# Patient Record
Sex: FEMALE | Race: WHITE | Employment: STUDENT | URBAN - METROPOLITAN AREA
[De-identification: names, ages, dates, MRNs, and addresses within clinical notes are randomized per-mention and may not be internally consistent; named-entity substitution may affect disease eponyms.]

---

## 2017-02-20 ENCOUNTER — OFFICE VISIT (OUTPATIENT)
Dept: FAMILY MEDICINE CLINIC | Age: 22
End: 2017-02-20

## 2017-02-20 VITALS
BODY MASS INDEX: 24.77 KG/M2 | HEIGHT: 67 IN | DIASTOLIC BLOOD PRESSURE: 80 MMHG | TEMPERATURE: 98.2 F | HEART RATE: 68 BPM | OXYGEN SATURATION: 99 % | SYSTOLIC BLOOD PRESSURE: 120 MMHG | WEIGHT: 157.8 LBS

## 2017-02-20 DIAGNOSIS — H10.32 ACUTE CONJUNCTIVITIS OF LEFT EYE, UNSPECIFIED ACUTE CONJUNCTIVITIS TYPE: Primary | ICD-10-CM

## 2017-02-20 DIAGNOSIS — R09.81 NASAL CONGESTION: ICD-10-CM

## 2017-02-20 PROCEDURE — G8427 DOCREV CUR MEDS BY ELIG CLIN: HCPCS | Performed by: NURSE PRACTITIONER

## 2017-02-20 PROCEDURE — G8484 FLU IMMUNIZE NO ADMIN: HCPCS | Performed by: NURSE PRACTITIONER

## 2017-02-20 PROCEDURE — G8420 CALC BMI NORM PARAMETERS: HCPCS | Performed by: NURSE PRACTITIONER

## 2017-02-20 PROCEDURE — 1036F TOBACCO NON-USER: CPT | Performed by: NURSE PRACTITIONER

## 2017-02-20 PROCEDURE — 99213 OFFICE O/P EST LOW 20 MIN: CPT | Performed by: NURSE PRACTITIONER

## 2017-02-20 RX ORDER — NORETHINDRONE AND ETHINYL ESTRADIOL 1 MG-35MCG
KIT ORAL
Refills: 10 | COMMUNITY
Start: 2016-12-10

## 2017-02-20 RX ORDER — POLYMYXIN B SULFATE AND TRIMETHOPRIM 1; 10000 MG/ML; [USP'U]/ML
1 SOLUTION OPHTHALMIC
Qty: 1 BOTTLE | Refills: 0 | Status: SHIPPED | OUTPATIENT
Start: 2017-02-20 | End: 2017-03-02

## 2017-02-20 RX ORDER — OXYMETAZOLINE HYDROCHLORIDE 0.05 G/100ML
2 SPRAY NASAL 2 TIMES DAILY
Refills: 3 | COMMUNITY
Start: 2017-02-20 | End: 2018-02-20

## 2017-02-20 RX ORDER — CLINDAMYCIN PHOSPHATE AND BENZOYL PEROXIDE 10; 50 MG/G; MG/G
GEL TOPICAL
Refills: 1 | COMMUNITY
Start: 2016-11-25

## 2022-09-06 ENCOUNTER — INITIAL PRENATAL (OUTPATIENT)
Dept: OBGYN CLINIC | Age: 27
End: 2022-09-06

## 2022-09-06 ENCOUNTER — ANCILLARY PROCEDURE (OUTPATIENT)
Dept: OBGYN CLINIC | Age: 27
End: 2022-09-06

## 2022-09-06 VITALS
WEIGHT: 179 LBS | DIASTOLIC BLOOD PRESSURE: 76 MMHG | HEIGHT: 68 IN | SYSTOLIC BLOOD PRESSURE: 115 MMHG | HEART RATE: 66 BPM | BODY MASS INDEX: 27.13 KG/M2

## 2022-09-06 DIAGNOSIS — Z36.3 ANTENATAL SCREENING FOR MALFORMATION USING ULTRASONICS: Primary | ICD-10-CM

## 2022-09-06 DIAGNOSIS — Z34.02 PRIMIGRAVIDA IN SECOND TRIMESTER: ICD-10-CM

## 2022-09-06 DIAGNOSIS — Z3A.20 20 WEEKS GESTATION OF PREGNANCY: ICD-10-CM

## 2022-09-06 LAB
GLUCOSE URINE, POC: NORMAL
PROTEIN UA: NEGATIVE

## 2022-09-06 PROCEDURE — 99999 PR OFFICE/OUTPT VISIT,PROCEDURE ONLY: CPT | Performed by: OBSTETRICS & GYNECOLOGY

## 2022-09-06 PROCEDURE — 99203 OFFICE O/P NEW LOW 30 MIN: CPT | Performed by: OBSTETRICS & GYNECOLOGY

## 2022-09-06 PROCEDURE — 81002 URINALYSIS NONAUTO W/O SCOPE: CPT | Performed by: OBSTETRICS & GYNECOLOGY

## 2022-09-06 PROCEDURE — 76811 OB US DETAILED SNGL FETUS: CPT | Performed by: OBSTETRICS & GYNECOLOGY

## 2022-09-06 NOTE — PROGRESS NOTES
[address] [facility]  [address] [facility] [address] [phone]   [address] [facility]  [address] [facility] [address] [address]  Dept: [phone]  Loc: [phone]     2022    RE:  Svitlana Barnett     : 1995   AGE: 31 y.o. Dear Dr. Benja Montiel,    Thank you for allowing me to see Svitlana Barnett. As I'm sure you will recall, Svitlana Barnett is a 31 y.o. [id number]'s last menstrual period was 2022. Estimated Date of Delivery: 23 at 20w3d seen in our office today for the following:    REASON FOR VISIT: Level II    Patient Active Problem List    Diagnosis Date Noted    Primigravida in second trimester 2022    20 weeks gestation of pregnancy 2022        PAST HISTORY:  OB History    Para Term  AB Living   1         0   SAB IAB Ectopic Molar Multiple Live Births                    # Outcome Date GA Lbr Paul/2nd Weight Sex Delivery Anes PTL Lv   1 Current                   MEDICAL:  Past Medical History:   Diagnosis Date    Herniation of nucleus pulposus 2015        SURGICAL:  History reviewed. No pertinent surgical history. ALLERGIES:    No Known Allergies      MEDICATIONS:    Current Outpatient Medications   Medication Sig Dispense Refill    Prenatal Vit-Fe Fumarate-FA (PRENATAL 1+1 PO) Take by mouth      Clindamycin-Benzoyl Per, Refr, 1.2-5 % GEL APPLY TO AFFECTED AREA OF FACE EVERY DAY AT BEDTIME (Patient not taking: Reported on 2022)  1    ALAYCEN  1-35 MG-MCG per tablet TAKE 1 TABLET BY MOUTH ONCE A DAY (Patient not taking: Reported on 2022)  10    loratadine-pseudoephedrine (CLARITIN-D 12 HOUR) 5-120 MG per extended release tablet Take 1 tablet by mouth 2 times daily (Patient not taking: Reported on 2022)       No current facility-administered medications for this visit.         Social History     Socioeconomic History    Marital status: Single     Spouse name: None    Number of children: None    Years of education: None    Highest education level: None   Tobacco Use    Smoking status: Never    Smokeless tobacco: Never   Vaping Use    Vaping Use: Never used   Substance and Sexual Activity    Alcohol use: Not Currently     Alcohol/week: 6.0 standard drinks     Types: 3 Cans of beer, 3 Shots of liquor per week    Drug use: No    Sexual activity: Yes     Partners: Male     Birth control/protection: Condom, Pill          FAMILY MEDICAL HISTORY:   No family history on file. PHYSICAL EXAMINATION:  /76   Pulse 66   Ht 5' 8\" (1.727 m)   Wt 179 lb (81.2 kg)   LMP 04/12/2022   Body mass index is 27.22 kg/m². Urine dipstick:  Results for POC orders placed in visit on 09/06/22   POCT urine qual dipstick protein   Result Value Ref Range    Protein, UA Negative Negative   POCT urine qual dipstick glucose   Result Value Ref Range    Glucose, UA POC neg         A/an Level II ultrasound was done in our office today. Please refer to the enclosed copy of the ultrasound report for further information. Discussion:  There is a mejia fetus in a vertex presentation. Fetal cardiac motion and fetal motion was observed and appeared to be grossly normal.  No anomalies are noted however the scan was very limited by the fetal position specifically limited cranial and cardiac views. There were no concerns for anomalies. There is been appropriate interval growth. The cervix is long and closed. The placenta was posterior. Amniotic fluid volume is normal.    IMPRESSION:  1. Single intrauterine gestation at 20+ weeks with appropriate interval growth. 2. The fetal anatomy appears normal and the fetal lie is cephalic. 3. The amniotic fluid volume is normal and the placenta is posterior. RECOMMENDATIONS:  Each of the recommendations were discussed with the patient:  1.   Return in 4 weeks for completion of anatomy. 2.  Follow-up would be otherwise as indicated. The patient is to continue to follow with you in your office for ongoing obstetric care. PLAN:    As noted above or sooner prn.     Sincerely,        Maria A Hitchcock MD

## 2022-09-06 NOTE — PROGRESS NOTES
Patient is here today for anatomy scan. Denies any vaginal bleeding, cramping, or leakage of fluids. Patient reports good fetal movement.

## 2022-10-04 ENCOUNTER — ROUTINE PRENATAL (OUTPATIENT)
Dept: OBGYN CLINIC | Age: 27
End: 2022-10-04

## 2022-10-04 ENCOUNTER — ANCILLARY PROCEDURE (OUTPATIENT)
Dept: OBGYN CLINIC | Age: 27
End: 2022-10-04

## 2022-10-04 VITALS
WEIGHT: 186 LBS | DIASTOLIC BLOOD PRESSURE: 80 MMHG | HEART RATE: 69 BPM | BODY MASS INDEX: 28.28 KG/M2 | SYSTOLIC BLOOD PRESSURE: 124 MMHG

## 2022-10-04 DIAGNOSIS — Z34.90 PREGNANCY, UNSPECIFIED GESTATIONAL AGE: Primary | ICD-10-CM

## 2022-10-04 LAB
GLUCOSE URINE, POC: NORMAL
PROTEIN UA: NEGATIVE

## 2022-10-04 PROCEDURE — 81002 URINALYSIS NONAUTO W/O SCOPE: CPT | Performed by: OBSTETRICS & GYNECOLOGY

## 2022-10-04 PROCEDURE — 99999 PR OFFICE/OUTPT VISIT,PROCEDURE ONLY: CPT | Performed by: OBSTETRICS & GYNECOLOGY

## 2022-10-04 PROCEDURE — 99213 OFFICE O/P EST LOW 20 MIN: CPT | Performed by: OBSTETRICS & GYNECOLOGY

## 2022-10-04 PROCEDURE — 76816 OB US FOLLOW-UP PER FETUS: CPT | Performed by: OBSTETRICS & GYNECOLOGY

## 2022-10-04 NOTE — PROGRESS NOTES
[address] [facility]  [address] [facility] [address] [phone]   [address] [facility]  [id number] [place] [address]. [address] [address]  Dept: [phone]  Loc: [phone]     10/4/2022    RE:  Anusha Alcantara     : 1995   AGE: 31 y.o. Dear Dr. Clyde Baumgarten,    Thank you for allowing me to see Anusha Alcantara. As I'm sure you will recall, Anusha Alcantara is a 31 y.o. [id number]'s last menstrual period was 2022. Estimated Date of Delivery: 23 at 24w3d seen in our office today for the following:    REASON FOR VISIT: Completion of anatomy    Patient Active Problem List    Diagnosis Date Noted    Primigravida in second trimester 2022    24 weeks gestation of pregnancy 2022        PAST HISTORY:  OB History    Para Term  AB Living   1         0   SAB IAB Ectopic Molar Multiple Live Births                    # Outcome Date GA Lbr Paul/2nd Weight Sex Delivery Anes PTL Lv   1 Current                   MEDICAL:  Past Medical History:   Diagnosis Date    Herniation of nucleus pulposus 2015        SURGICAL:  No past surgical history on file. ALLERGIES:    No Known Allergies      MEDICATIONS:    Current Outpatient Medications   Medication Sig Dispense Refill    Prenatal Vit-Fe Fumarate-FA (PRENATAL 1+1 PO) Take by mouth      Clindamycin-Benzoyl Per, Refr, 1.2-5 % GEL APPLY TO AFFECTED AREA OF FACE EVERY DAY AT BEDTIME (Patient not taking: Reported on 2022)  1    ALAYCEN  1-35 MG-MCG per tablet TAKE 1 TABLET BY MOUTH ONCE A DAY (Patient not taking: Reported on 2022)  10    loratadine-pseudoephedrine (CLARITIN-D 12 HOUR) 5-120 MG per extended release tablet Take 1 tablet by mouth 2 times daily (Patient not taking: Reported on 2022)       No current facility-administered medications for this visit.         Social History     Socioeconomic History    Marital status: Single   Tobacco Use    Smoking status: Never    Smokeless tobacco: Never   Vaping Use    Vaping Use: Never used   Substance and Sexual Activity    Alcohol use: Not Currently     Alcohol/week: 6.0 standard drinks     Types: 3 Cans of beer, 3 Shots of liquor per week    Drug use: No    Sexual activity: Yes     Partners: Male     Birth control/protection: Condom, Pill          FAMILY MEDICAL HISTORY:   No family history on file. PHYSICAL EXAMINATION:  /80   Pulse 69   Wt 186 lb (84.4 kg)   LMP 04/12/2022   Body mass index is 28.28 kg/m². Urine dipstick:  Results for POC orders placed in visit on 10/04/22   POCT urine qual dipstick protein   Result Value Ref Range    Protein, UA Negative Negative   POCT urine qual dipstick glucose   Result Value Ref Range    Glucose, UA POC neg         A/an completion of anatomy ultrasound was done in our office today. Please refer to the enclosed copy of the ultrasound report for further information. Discussion:  There is a mejia fetus in the cephalic presentation. Fetal cardiac motion and fetal motion was detected and appeared to be grossly normal.  We were able to complete the majority of the fetal anatomy. Unable to visualize the maxilla and mandible well. This was due primarily to fetal position. There is been appropriate interval growth. The baby is AGA. The placenta is posterior. Amniotic fluid was normal.    IMPRESSION:  1. Single intrauterine gestation at 24+  weeks with appropriate interval growth. 2. The fetal anatomy appears normal and the fetal lie is cephalic. 3. The amniotic fluid volume is normal and the placenta is posterior. RECOMMENDATIONS:  Each of the recommendations were discussed with the patient:  1. Follow-up would be as clinically indicated. The patient is to continue to follow with you in your office for ongoing obstetric care.      PLAN:    As noted above or sooner prn.    Sincerely,        Jewels Bentley MD

## 2023-01-12 ENCOUNTER — HOSPITAL ENCOUNTER (INPATIENT)
Age: 28
LOS: 2 days | Discharge: HOME | End: 2023-01-14
Attending: OBSTETRICS & GYNECOLOGY | Admitting: OBSTETRICS & GYNECOLOGY
Payer: COMMERCIAL

## 2023-01-12 ENCOUNTER — ANESTHESIA EVENT (OUTPATIENT)
Dept: LABOR AND DELIVERY | Age: 28
End: 2023-01-12
Payer: COMMERCIAL

## 2023-01-12 ENCOUNTER — ANESTHESIA (OUTPATIENT)
Dept: LABOR AND DELIVERY | Age: 28
End: 2023-01-12
Payer: COMMERCIAL

## 2023-01-12 PROBLEM — Z3A.38 38 WEEKS GESTATION OF PREGNANCY: Status: ACTIVE | Noted: 2023-01-12

## 2023-01-12 LAB
ABO/RH: NORMAL
AMNISURE, POC: POSITIVE
AMPHETAMINE SCREEN, URINE: NOT DETECTED
ANTIBODY SCREEN: NORMAL
BARBITURATE SCREEN URINE: NOT DETECTED
BENZODIAZEPINE SCREEN, URINE: NOT DETECTED
CANNABINOID SCREEN URINE: NOT DETECTED
COCAINE METABOLITE SCREEN URINE: NOT DETECTED
FENTANYL SCREEN, URINE: NOT DETECTED
HCT VFR BLD CALC: 41.4 % (ref 34–48)
HEMOGLOBIN: 14.9 G/DL (ref 11.5–15.5)
Lab: NORMAL
Lab: NORMAL
MCH RBC QN AUTO: 32.3 PG (ref 26–35)
MCHC RBC AUTO-ENTMCNC: 36 % (ref 32–34.5)
MCV RBC AUTO: 89.6 FL (ref 80–99.9)
METHADONE SCREEN, URINE: NOT DETECTED
NEGATIVE QC PASS/FAIL: NORMAL
OPIATE SCREEN URINE: NOT DETECTED
OXYCODONE URINE: NOT DETECTED
PDW BLD-RTO: 11.5 FL (ref 11.5–15)
PHENCYCLIDINE SCREEN URINE: NOT DETECTED
PLATELET # BLD: 228 E9/L (ref 130–450)
PMV BLD AUTO: 11.9 FL (ref 7–12)
POSITIVE QC PASS/FAIL: NORMAL
RBC # BLD: 4.62 E12/L (ref 3.5–5.5)
WBC # BLD: 11.6 E9/L (ref 4.5–11.5)

## 2023-01-12 PROCEDURE — 86900 BLOOD TYPING SEROLOGIC ABO: CPT

## 2023-01-12 PROCEDURE — 86850 RBC ANTIBODY SCREEN: CPT

## 2023-01-12 PROCEDURE — 2580000003 HC RX 258: Performed by: OBSTETRICS & GYNECOLOGY

## 2023-01-12 PROCEDURE — 36415 COLL VENOUS BLD VENIPUNCTURE: CPT

## 2023-01-12 PROCEDURE — 80307 DRUG TEST PRSMV CHEM ANLYZR: CPT

## 2023-01-12 PROCEDURE — 3700000025 EPIDURAL BLOCK: Performed by: ANESTHESIOLOGY

## 2023-01-12 PROCEDURE — 6360000002 HC RX W HCPCS: Performed by: OBSTETRICS & GYNECOLOGY

## 2023-01-12 PROCEDURE — 2500000003 HC RX 250 WO HCPCS: Performed by: ANESTHESIOLOGY

## 2023-01-12 PROCEDURE — 85027 COMPLETE CBC AUTOMATED: CPT

## 2023-01-12 PROCEDURE — 86901 BLOOD TYPING SEROLOGIC RH(D): CPT

## 2023-01-12 PROCEDURE — 1220000001 HC SEMI PRIVATE L&D R&B

## 2023-01-12 RX ORDER — ONDANSETRON 2 MG/ML
4 INJECTION INTRAMUSCULAR; INTRAVENOUS EVERY 6 HOURS PRN
Status: DISCONTINUED | OUTPATIENT
Start: 2023-01-12 | End: 2023-01-13 | Stop reason: HOSPADM

## 2023-01-12 RX ORDER — MISOPROSTOL 200 UG/1
800 TABLET ORAL PRN
Status: DISCONTINUED | OUTPATIENT
Start: 2023-01-12 | End: 2023-01-13

## 2023-01-12 RX ORDER — METHYLERGONOVINE MALEATE 0.2 MG/ML
200 INJECTION INTRAVENOUS PRN
Status: DISCONTINUED | OUTPATIENT
Start: 2023-01-12 | End: 2023-01-13

## 2023-01-12 RX ORDER — SODIUM CHLORIDE, SODIUM LACTATE, POTASSIUM CHLORIDE, AND CALCIUM CHLORIDE .6; .31; .03; .02 G/100ML; G/100ML; G/100ML; G/100ML
1000 INJECTION, SOLUTION INTRAVENOUS PRN
Status: DISCONTINUED | OUTPATIENT
Start: 2023-01-12 | End: 2023-01-13

## 2023-01-12 RX ORDER — ONDANSETRON 2 MG/ML
4 INJECTION INTRAMUSCULAR; INTRAVENOUS EVERY 6 HOURS PRN
Status: DISCONTINUED | OUTPATIENT
Start: 2023-01-12 | End: 2023-01-13

## 2023-01-12 RX ORDER — NALOXONE HYDROCHLORIDE 0.4 MG/ML
INJECTION, SOLUTION INTRAMUSCULAR; INTRAVENOUS; SUBCUTANEOUS PRN
Status: DISCONTINUED | OUTPATIENT
Start: 2023-01-12 | End: 2023-01-13 | Stop reason: HOSPADM

## 2023-01-12 RX ORDER — SODIUM CHLORIDE, SODIUM LACTATE, POTASSIUM CHLORIDE, CALCIUM CHLORIDE 600; 310; 30; 20 MG/100ML; MG/100ML; MG/100ML; MG/100ML
INJECTION, SOLUTION INTRAVENOUS CONTINUOUS
Status: DISCONTINUED | OUTPATIENT
Start: 2023-01-12 | End: 2023-01-13

## 2023-01-12 RX ORDER — SODIUM CHLORIDE, SODIUM LACTATE, POTASSIUM CHLORIDE, AND CALCIUM CHLORIDE .6; .31; .03; .02 G/100ML; G/100ML; G/100ML; G/100ML
500 INJECTION, SOLUTION INTRAVENOUS PRN
Status: DISCONTINUED | OUTPATIENT
Start: 2023-01-12 | End: 2023-01-13

## 2023-01-12 RX ORDER — CARBOPROST TROMETHAMINE 250 UG/ML
250 INJECTION, SOLUTION INTRAMUSCULAR PRN
Status: DISCONTINUED | OUTPATIENT
Start: 2023-01-12 | End: 2023-01-13

## 2023-01-12 RX ADMIN — SODIUM CHLORIDE, POTASSIUM CHLORIDE, SODIUM LACTATE AND CALCIUM CHLORIDE: 600; 310; 30; 20 INJECTION, SOLUTION INTRAVENOUS at 19:30

## 2023-01-12 RX ADMIN — Medication 15 ML/HR: at 23:03

## 2023-01-12 RX ADMIN — Medication 1 MILLI-UNITS/MIN: at 20:06

## 2023-01-12 RX ADMIN — Medication 5 ML: at 23:02

## 2023-01-12 ASSESSMENT — LIFESTYLE VARIABLES: SMOKING_STATUS: 0

## 2023-01-12 NOTE — PROGRESS NOTES
presents to unit with c/o SROM this morning at 0300. Pt states she has had some back pain, but now abdominal contractions or vaginal bleeding. Perceives fetal movement. Pt denies complications with current pregnancy. SANTIAGO: 23. Paced on efm. Call light within reach.     Amnisure +

## 2023-01-13 PROCEDURE — 1220000000 HC SEMI PRIVATE OB R&B

## 2023-01-13 PROCEDURE — 7200000001 HC VAGINAL DELIVERY

## 2023-01-13 PROCEDURE — 51701 INSERT BLADDER CATHETER: CPT

## 2023-01-13 PROCEDURE — 6370000000 HC RX 637 (ALT 250 FOR IP): Performed by: OBSTETRICS & GYNECOLOGY

## 2023-01-13 RX ORDER — MODIFIED LANOLIN
OINTMENT (GRAM) TOPICAL PRN
Status: DISCONTINUED | OUTPATIENT
Start: 2023-01-13 | End: 2023-01-14 | Stop reason: HOSPADM

## 2023-01-13 RX ORDER — ACETAMINOPHEN 500 MG
1000 TABLET ORAL EVERY 8 HOURS PRN
Status: DISCONTINUED | OUTPATIENT
Start: 2023-01-13 | End: 2023-01-14 | Stop reason: HOSPADM

## 2023-01-13 RX ORDER — IBUPROFEN 600 MG/1
600 TABLET ORAL EVERY 8 HOURS PRN
Status: DISCONTINUED | OUTPATIENT
Start: 2023-01-13 | End: 2023-01-14 | Stop reason: HOSPADM

## 2023-01-13 RX ORDER — SODIUM CHLORIDE 9 MG/ML
INJECTION, SOLUTION INTRAVENOUS PRN
Status: DISCONTINUED | OUTPATIENT
Start: 2023-01-13 | End: 2023-01-14 | Stop reason: HOSPADM

## 2023-01-13 RX ORDER — FERROUS SULFATE 325(65) MG
325 TABLET ORAL 2 TIMES DAILY WITH MEALS
Status: DISCONTINUED | OUTPATIENT
Start: 2023-01-13 | End: 2023-01-14 | Stop reason: HOSPADM

## 2023-01-13 RX ORDER — SODIUM CHLORIDE 0.9 % (FLUSH) 0.9 %
5-40 SYRINGE (ML) INJECTION EVERY 12 HOURS SCHEDULED
Status: DISCONTINUED | OUTPATIENT
Start: 2023-01-13 | End: 2023-01-14 | Stop reason: HOSPADM

## 2023-01-13 RX ORDER — SODIUM CHLORIDE 0.9 % (FLUSH) 0.9 %
5-40 SYRINGE (ML) INJECTION PRN
Status: DISCONTINUED | OUTPATIENT
Start: 2023-01-13 | End: 2023-01-14 | Stop reason: HOSPADM

## 2023-01-13 RX ORDER — SODIUM CHLORIDE, SODIUM LACTATE, POTASSIUM CHLORIDE, CALCIUM CHLORIDE 600; 310; 30; 20 MG/100ML; MG/100ML; MG/100ML; MG/100ML
INJECTION, SOLUTION INTRAVENOUS CONTINUOUS
Status: DISCONTINUED | OUTPATIENT
Start: 2023-01-13 | End: 2023-01-14 | Stop reason: HOSPADM

## 2023-01-13 RX ORDER — ACETAMINOPHEN 650 MG
TABLET, EXTENDED RELEASE ORAL
Status: DISCONTINUED
Start: 2023-01-13 | End: 2023-01-13

## 2023-01-13 RX ORDER — DOCUSATE SODIUM 100 MG/1
100 CAPSULE, LIQUID FILLED ORAL 2 TIMES DAILY
Status: DISCONTINUED | OUTPATIENT
Start: 2023-01-13 | End: 2023-01-14 | Stop reason: HOSPADM

## 2023-01-13 RX ADMIN — ACETAMINOPHEN 1000 MG: 500 TABLET ORAL at 21:15

## 2023-01-13 RX ADMIN — Medication: at 10:07

## 2023-01-13 RX ADMIN — IBUPROFEN 600 MG: 600 TABLET, FILM COATED ORAL at 10:08

## 2023-01-13 RX ADMIN — DOCUSATE SODIUM 100 MG: 100 CAPSULE, LIQUID FILLED ORAL at 10:08

## 2023-01-13 RX ADMIN — Medication: at 10:08

## 2023-01-13 RX ADMIN — DOCUSATE SODIUM 100 MG: 100 CAPSULE, LIQUID FILLED ORAL at 21:15

## 2023-01-13 RX ADMIN — Medication 166.7 ML: at 05:49

## 2023-01-13 ASSESSMENT — PAIN DESCRIPTION - LOCATION
LOCATION: ABDOMEN
LOCATION: BACK

## 2023-01-13 ASSESSMENT — PAIN - FUNCTIONAL ASSESSMENT
PAIN_FUNCTIONAL_ASSESSMENT: ACTIVITIES ARE NOT PREVENTED
PAIN_FUNCTIONAL_ASSESSMENT: ACTIVITIES ARE NOT PREVENTED

## 2023-01-13 ASSESSMENT — PAIN SCALES - GENERAL
PAINLEVEL_OUTOF10: 4
PAINLEVEL_OUTOF10: 4

## 2023-01-13 ASSESSMENT — PAIN DESCRIPTION - ORIENTATION
ORIENTATION: LOWER
ORIENTATION: LOWER;MID

## 2023-01-13 ASSESSMENT — PAIN DESCRIPTION - DESCRIPTORS
DESCRIPTORS: DISCOMFORT
DESCRIPTORS: DISCOMFORT;SORE

## 2023-01-13 NOTE — PROCEDURES
Vaginal Delivery    Infant Wt:  pending    APGARS:     1 minute: 9  5 minute: 9    Time of Delivery:0542  Delayed cord clamping. Fetal Position: occiput anterior    Baby Suction with bulb on the perineum. Spontaneous respirations. Placenta delivered spontaneously intact at 0550. Placenta sent to pathology: No     Estimated blood loss:  300 cc    Uterus firm, pitocin running. rectum intact. Episiotomy: Yes  Repaired with 3.0 Vicryl  Laceration: No    Cord blood and gases done  Sponge count correct. No complications.

## 2023-01-13 NOTE — PROGRESS NOTES
Called and updated Dr. Ivania Collins that patient is complete and +1, states to let patient labor down and that he will call in when patient is able to start pushing.

## 2023-01-13 NOTE — ANESTHESIA PRE PROCEDURE
Department of Anesthesiology  Preprocedure Note       Name:  Luis A Diego   Age:  28 y.o.  :  1995                                          MRN:  [id number]         Date:  2023      Surgeon: * No surgeons listed *    Procedure: * No procedures listed *    Medications prior to admission:   Prior to Admission medications    Medication Sig Start Date End Date Taking?  Authorizing Provider   Prenatal Vit-Fe Fumarate-FA (PRENATAL 1+1 PO) Take by mouth    Historical Provider, MD   Clindamycin-Benzoyl Per, Refr, 1.2-5 % GEL APPLY TO AFFECTED AREA OF FACE EVERY DAY AT BEDTIME  Patient not taking: Reported on 16   Historical Provider, MD Chaz Stock  1-35 MG-MCG per tablet TAKE 1 TABLET BY MOUTH ONCE A DAY  Patient not taking: Reported on 2022 12/10/16   Historical Provider, MD   loratadine-pseudoephedrine (CLARITIN-D 12 HOUR) 5-120 MG per extended release tablet Take 1 tablet by mouth 2 times daily  Patient not taking: Reported on 2022   Baudilio Del Cid, APRN - CNP       Current medications:    Current Facility-Administered Medications   Medication Dose Route Frequency Provider Last Rate Last Admin    lactated ringers infusion   IntraVENous Continuous Lisa Matthews  mL/hr at 23 New Bag at 23    lactated ringers bolus  500 mL IntraVENous PRN Lisa Matthews MD        Or   Joyce Watkins lactated ringers bolus  1,000 mL IntraVENous PRN Lisa Matthews MD        methylergonovine (METHERGINE) injection 200 mcg  200 mcg IntraMUSCular PRN Lisa Matthews MD        carboprost (HEMABATE) injection 250 mcg  250 mcg IntraMUSCular PRN Lisa Matthews MD        miSOPROStol (CYTOTEC) tablet 800 mcg  800 mcg Rectal PRN Lisa Matthews MD        tranexamic acid (CYKLOKAPRON) 1,000 mg in sodium chloride 0.9 % 100 mL IVPB  1,000 mg IntraVENous Once PRN Lisa Matthews MD        oxytocin (PITOCIN) 15 units in 250 mL infusion  87.3 arcelia-units/min IntraVENous Continuous PRN Bishop Zelalem MD        And    oxytocin (PITOCIN) 10 unit bolus from the bag  10 Units IntraVENous PRN MD Christa Adams [facility]) injection 4 mg  4 mg IntraVENous Q6H PRN Bishop Zelalem MD        oxytocin (PITOCIN) 15 units in 250 mL infusion  1-20 arcelia-units/min IntraVENous Continuous Bishop Zelalem MD 2 mL/hr at 01/12/23 2043 2 arcelia-units/min at 01/12/23 2043    naloxone 0.4 mg in 10 mL sodium chloride syringe   IntraVENous PRN MD Christa Peralta [facility]) injection 4 mg  4 mg IntraVENous Q6H PRN Arlet Yuan MD        fentaNYL 1.85mcg/ml and Bupivicaine 0.1% in 0.9% NS 135ml infusion (OB) epidural  15 mL/hr Epidural Continuous Arlet Yuan MD           Allergies:  No Known Allergies    Problem List:    Patient Active Problem List   Diagnosis Code    Primigravida in second trimester Z34.02    24 weeks gestation of pregnancy Z3A.24    38 weeks gestation of pregnancy Z3A.38       Past Medical History:        Diagnosis Date    Herniation of nucleus pulposus 6/1/2015       Past Surgical History:  History reviewed. No pertinent surgical history.     Social History:    Social History     Tobacco Use    Smoking status: Never    Smokeless tobacco: Never   Substance Use Topics    Alcohol use: Not Currently     Alcohol/week: 6.0 standard drinks     Types: 3 Cans of beer, 3 Shots of liquor per week                                Counseling given: Not Answered      Vital Signs (Current):   Vitals:    01/12/23 1824   BP: 135/76   Pulse: 86   Resp: 16   Temp: 37 °C (98.6 °F)   TempSrc: Oral                                              BP Readings from Last 3 Encounters:   01/12/23 135/76   10/04/22 124/80   09/06/22 115/76       NPO Status:                                                                                 BMI:   Wt Readings from Last 3 Encounters:   10/04/22 186 lb (84.4 kg)   09/06/22 179 lb (81.2 kg)   02/20/17 157 lb 12.8 oz (71.6 kg)     There is no height or weight on file to calculate BMI.    CBC:   Lab Results   Component Value Date/Time    WBC 11.6 01/12/2023 07:55 PM    RBC 4.62 01/12/2023 07:55 PM    HGB 14.9 01/12/2023 07:55 PM    HCT 41.4 01/12/2023 07:55 PM    MCV 89.6 01/12/2023 07:55 PM    RDW 11.5 01/12/2023 07:55 PM     01/12/2023 07:55 PM       CMP:   Lab Results   Component Value Date/Time    GLUCOSE 78 10/12/2022 08:00 AM       POC Tests: No results for input(s): POCGLU, POCNA, POCK, POCCL, POCBUN, POCHEMO, POCHCT in the last 72 hours. Coags: No results found for: PROTIME, INR, APTT    HCG (If Applicable):   Lab Results   Component Value Date    PREGTESTUR neg 10/19/2016        ABGs: No results found for: PHART, PO2ART, LRM7KGJ, GEM9OXA, BEART, H8DQYWJW     Type & Screen (If Applicable):  No results found for: LABABO, LABRH    Drug/Infectious Status (If Applicable):  No results found for: HIV, HEPCAB    COVID-19 Screening (If Applicable): No results found for: COVID19        Anesthesia Evaluation  Patient summary reviewed and Nursing notes reviewed   history of anesthetic complications: PONV. Airway: Mallampati: I  TM distance: >3 FB   Neck ROM: full  Mouth opening: > = 3 FB   Dental: normal exam         Pulmonary:Negative Pulmonary ROS and normal exam  breath sounds clear to auscultation      (-) not a current smoker          Patient did not smoke on day of surgery. Cardiovascular:Negative CV ROS            Rhythm: regular  Rate: normal                    Neuro/Psych:   Negative Neuro/Psych ROS              GI/Hepatic/Renal: Neg GI/Hepatic/Renal ROS            Endo/Other: Negative Endo/Other ROS                    Abdominal:             Vascular: negative vascular ROS.          Other Findings:           Anesthesia Plan      general, spinal and epidural     ASA 2     (Discussed risks and benefits of epidural anesthesia, discussed spinal/general anesthesia if needed.)        Anesthetic plan and risks discussed with patient. Use of blood products discussed with patient whom consented to blood products. Plan discussed with attending.                     HALLIE Ventura - CRNA   1/12/2023

## 2023-01-13 NOTE — FLOWSHEET NOTE
Admitted to room 310 from L &D via wc. [facility] informational brochures and [facility]. Instructed on Post Partum Depression Evaluation. Instructed on [employer]. Instructed to call  for assist when needs up to void for 1st time. Call lite with in reach.  at bedside.

## 2023-01-13 NOTE — PROGRESS NOTES
of baby girl by Dr. Zoe Hannah, apgars 9/9, baby pink and active and placed in skin to skin with mother.

## 2023-01-13 NOTE — PROGRESS NOTES
Called and updated Dr. Michele Damico that patient is on 8 of pitocin, SVE of 1cm and requesting an epidural, notified that patient is not wanting stadol. Dr. Michele Damico states okay for an epidural and to leave pitocin on 8 until he calls and says othewise.

## 2023-01-13 NOTE — ANESTHESIA PROCEDURE NOTES
Epidural Block    Patient location during procedure: OB  Reason for block: labor epidural  Staffing  Performed: resident/CRNA   Resident/CRNA: HALLIE Claudio - CRNA  Epidural  Patient position: sitting  Prep: ChloraPrep  Patient monitoring: continuous pulse ox and frequent blood pressure checks  Approach: midline  Location: L3-4  Injection technique: LEONARDA air  Provider prep: mask and sterile gloves  Needle  Needle type: Tuohy   Needle gauge: 17 G  Needle length: 3.5 in  Needle insertion depth: 5 cm  Catheter type: end hole  Catheter size: 20 G.   Catheter at skin depth: 13 cm  Test dose: negativeCatheter Secured: tegaderm and tape  Assessment  Hemodynamics: stable  Attempts: 1  Outcomes: uncomplicated and patient tolerated procedure well  Preanesthetic Checklist  Completed: patient identified, IV checked, site marked, risks and benefits discussed, surgical/procedural consents, equipment checked, pre-op evaluation, timeout performed, anesthesia consent given, oxygen available and monitors applied/VS acknowledged

## 2023-01-13 NOTE — PROGRESS NOTES
Assumed care of patient, Kiki Olivera RN states patient was seen by Dr. Noni Pang and SVE was FT, orders received to start pitocin.

## 2023-01-14 VITALS
OXYGEN SATURATION: 100 % | TEMPERATURE: 98.8 F | RESPIRATION RATE: 18 BRPM | SYSTOLIC BLOOD PRESSURE: 134 MMHG | HEART RATE: 74 BPM | DIASTOLIC BLOOD PRESSURE: 72 MMHG

## 2023-01-14 PROCEDURE — 6370000000 HC RX 637 (ALT 250 FOR IP): Performed by: OBSTETRICS & GYNECOLOGY

## 2023-01-14 RX ORDER — IBUPROFEN 600 MG/1
600 TABLET ORAL EVERY 8 HOURS PRN
Qty: 18 TABLET | Refills: 0 | Status: SHIPPED | OUTPATIENT
Start: 2023-01-14

## 2023-01-14 RX ADMIN — DOCUSATE SODIUM 100 MG: 100 CAPSULE, LIQUID FILLED ORAL at 08:07

## 2023-01-14 RX ADMIN — ACETAMINOPHEN 1000 MG: 500 TABLET ORAL at 06:55

## 2023-01-14 ASSESSMENT — PAIN DESCRIPTION - ORIENTATION: ORIENTATION: LOWER;MID

## 2023-01-14 ASSESSMENT — PAIN DESCRIPTION - LOCATION: LOCATION: ABDOMEN

## 2023-01-14 ASSESSMENT — PAIN DESCRIPTION - DESCRIPTORS: DESCRIPTORS: DISCOMFORT;SORE

## 2023-01-14 ASSESSMENT — PAIN - FUNCTIONAL ASSESSMENT: PAIN_FUNCTIONAL_ASSESSMENT: ACTIVITIES ARE NOT PREVENTED

## 2023-01-14 NOTE — PROGRESS NOTES
Subjective:    Patient without complaints. Normal lochia. No pih symptoms. Wants to go home. Objective:  /72   Pulse 74   Temp 98.8 °F (37.1 °C) (Oral)   Resp 18   LMP 04/12/2022   SpO2 100%   Breastfeeding Unknown   Abdomen:  Uterus firm, non-tender  Extremities:  No calf pain    Assessment:  Post-partum day # 1  Vaginal delivery    Plan: Discharge home. Office in 6 wks. Call if pain, fever,heavy bleeding, headaches , visual changes,calf pain, shortness of breath,breast pain or redness or concerns. Nothing in vagina or heavy lifting. Told to take bp every other day for a week and call if sbp greater or equal to 198 or diastolic bp greater or equal to 95.

## 2023-01-14 NOTE — DISCHARGE INSTRUCTIONS
Follow-up with your OB doctor in   6 weeks for vaginal delivery unless otherwise instructed. Call office for an appointment. For breastfeeding support, you can contact our lactation specialists at [phone] or [phone]    DIET  Eat a well balanced diet focusing on foods high in fiber and protein  Drink plenty of fluids especially water. To avoid constipation you may take a mild stool softener as recommended by your doctor or midwife. ACTIVITY  Gradually increase your activity. Resume exercise regimen only after advised by your doctor or midwife. Avoid lifting anything heavier than your baby or a gallon of milk for SIX weeks. Avoid driving until your doctor or midwife has given their approval.  [place] slowly from a lying to sitting and then a standing position. Climb stairs one at a time. Use caution when carrying your baby up and down the stairs. No sexual activity for 6 weeks or until advised by your doctor - Nothing in vagina: intercourse, tampons, or douching. Be prepared to discuss family planning at your follow-up OB visit. You may feel tired or have a lack of energy. You may continue your prenatal vitamin to replenish nutrients post delivery. Nap when baby naps to catch up on sleep. May return to work or school in 6 weeks or as directed by OB. EMOTIONS  You may feed dee, sad, teary, & overwhelmed. Contact your OB provider if you feel you may be showing signs of postpartum depression, or have thoughts of harming yourself or your infant. If infant will not stop crying, contact another adult for help or place infant in their crib on their back and take a break. NEVER shake your infant. BLEEDING  Vaginal bleeding will decrease in amount over the next few weeks. You will notice that as your activity increases, your flow may increase. This is your body's way of telling you, you need to take things easier and rest more often.   Call your OB/ER if you are saturating more than one maxi pad in an hour. BREAST CARE  Take medications as recommended by your doctor or midwife for pain  If you develop a warm, red, tender area on your breast or develop a fever contact your OB provider. For breastfeeding moms:  If you become engorged, feeding may be more difficult or painful for 1-2 days. You may find it helpful to hand express some milk so that the infant can latch on more easily. While breastfeeding, continue to take your prenatal vitamins as directed by your doctor or midwife. Only take medications verified as safe for breastfeeding. For non-breastfeeding moms:  You may apply ice packs to your breasts over your bra for twenty minutes at a time for comfort. Avoid stimulation to your breasts, when showering allow the water to strike your back not your breasts. Wear a good fitting bra until your milk dries, such as a sports bra. INCISIONAL CARE / EDITA CARE  Clean your incision in the shower with mild soap. After shower pat the incision area dry and leave open to air. If used, Steri-stipes should be removed by 2 weeks. If used, Nikolay Cater should be removed by the OB in office by 1 week. If used/ordered, an abdominal binder may provide support for your incision. Use the edita-bottle after toileting until bleeding stops. Cleanse your perineum from front to back  If used, stitches or internal clips will dissolve in 4-6 weeks. You may use a sitz bath or soak in a clean tub as needed for comfort. Kegel exercises will help restore bladder control. SWELLING  Keep your legs elevated when sitting or lying. When wearing stocking or socks, make sure they are not too tight. WHEN TO CALL THE DOCTOR  If you have a temp of 100.4 or more. If your bleeding has increased and you are saturating a pad in an hour. Your abdomen is tender to touch. You are passing blood clots bigger than the size of a lemon. If you are experiencing extreme weakness or dizziness.    If you are having flu-like symptoms such as achy muscles or joints. There is a foul smell or a green color to your vaginal bleeding. If you have pain that cannot be relieved. You have persistent burning with urination or frequency. Call if you have concerns about your well-being. You are unable to sleep, eat, or are having thoughts of harming yourself or your baby. You have swelling, bleeding, drainage, foul odor, redness, or warmth in/around your incision or stitches. You have a red, warm, tender area in you calf.

## 2023-01-14 NOTE — ANESTHESIA POSTPROCEDURE EVALUATION
Department of Anesthesiology  Postprocedure Note    Patient: Jay German  MRN: [id number]  YOB: 1995  Date of evaluation: 1/14/2023      Procedure Summary     Date: 01/12/23 Room / Location:     Anesthesia Start: 2249 Anesthesia Stop: 01/13/23 0542    Procedure: Labor Analgesia Diagnosis:     Scheduled Providers:  Responsible Provider: Sydney Lazar MD    Anesthesia Type: general, spinal, epidural ASA Status: 2          Anesthesia Type: No value filed.     Dee Phase I:      Dee Phase II:        Anesthesia Post Evaluation    Patient location during evaluation: bedside  Patient participation: complete - patient participated  Level of consciousness: awake and alert  Pain score: 0  Airway patency: patent  Nausea & Vomiting: no nausea and no vomiting  Complications: no  Cardiovascular status: blood pressure returned to baseline  Respiratory status: acceptable  Hydration status: euvolemic

## 2023-01-14 NOTE — FLOWSHEET NOTE
Patient discharged to home in stable condition via wheelchair carrying infant on her lap in car seat. Patient and infant accompanied by fob.

## 2023-01-14 NOTE — PROGRESS NOTES
[facility] [place] Hearing screening results were discussed with parent. Questions answered. Brochure given to parent. Advised to monitor developmental milestones and contact physician for any concerns.    Michael Dumont

## 2023-01-16 NOTE — H&P
[id number] [address]                              HISTORY AND PHYSICAL    PATIENT NAME: Francesco Eubanks                :        1995  MED REC NO:   [id number]                            ROOM:       0310  ACCOUNT NO:   [de-identified]                           ADMIT DATE: 2023  PROVIDER:     Junior Bee MD    HISTORY OF PRESENT ILLNESS:  The patient is a 25-year-old;  1,  para 0. Estimated due date is 2023. The patient presented on the  evening of 2023, complaining of spontaneous rupture of membranes. Some contractions. No bleeding. Normal fetal movement. MEDICINES:  Prenatal vitamins. ALLERGIES:  No known allergies. PAST MEDICAL HISTORY:  Negative. PAST SURGICAL HISTORY:  Negative. LABORATORY STUDIES:  Group B strep is negative. PHYSICAL EXAMINATION:  VITAL SIGNS:  Stable. LUNGS:  Clear to auscultation. HEART:  Regular rate and rhythm. ABDOMEN AND PELVIS:  Gravid. Cervix was fingertip. Fetal heart tones  reactive. Irregular contractions. AmniSure was positive. ASSESSMENT:  1.   1, para 0, at 38 plus weeks. 2.  Spontaneous rupture of membranes. 3.  Irregular contractions. PLAN:  The plan is to start Pitocin. The patient gave me verbal consent  for the use of Pitocin.         Salazar Seo MD    D: 2023 9:10:54       T: 2023 9:35:30     [email or web address]  Job#: [id number]     Doc#: [id number]    CC: